# Patient Record
Sex: MALE | URBAN - METROPOLITAN AREA
[De-identification: names, ages, dates, MRNs, and addresses within clinical notes are randomized per-mention and may not be internally consistent; named-entity substitution may affect disease eponyms.]

---

## 2017-06-07 ENCOUNTER — APPOINTMENT (OUTPATIENT)
Age: 77
Setting detail: DERMATOLOGY
End: 2017-06-12

## 2017-06-07 DIAGNOSIS — L20.89 OTHER ATOPIC DERMATITIS: ICD-10-CM

## 2017-06-07 DIAGNOSIS — L29.8 OTHER PRURITUS: ICD-10-CM

## 2017-06-07 PROBLEM — L20.84 INTRINSIC (ALLERGIC) ECZEMA: Status: ACTIVE | Noted: 2017-06-07

## 2017-06-07 PROCEDURE — OTHER PRESCRIPTION: OTHER

## 2017-06-07 PROCEDURE — OTHER COUNSELING: OTHER

## 2017-06-07 PROCEDURE — OTHER OTHER: OTHER

## 2017-06-07 PROCEDURE — OTHER TREATMENT REGIMEN: OTHER

## 2017-06-07 PROCEDURE — 99203 OFFICE O/P NEW LOW 30 MIN: CPT

## 2017-06-07 RX ORDER — BETAMETHASONE DIPROPIONATE 0.5 MG/G
CREAM TOPICAL
Qty: 2 | Refills: 0 | Status: ERX | COMMUNITY
Start: 2017-06-07

## 2017-06-07 ASSESSMENT — LOCATION SIMPLE DESCRIPTION DERM
LOCATION SIMPLE: LEFT FOREARM
LOCATION SIMPLE: RIGHT THIGH
LOCATION SIMPLE: LEFT PRETIBIAL REGION
LOCATION SIMPLE: RIGHT FOREARM
LOCATION SIMPLE: RIGHT PRETIBIAL REGION
LOCATION SIMPLE: RIGHT UPPER BACK
LOCATION SIMPLE: ABDOMEN
LOCATION SIMPLE: LEFT THIGH

## 2017-06-07 ASSESSMENT — LOCATION ZONE DERM
LOCATION ZONE: LEG
LOCATION ZONE: ARM
LOCATION ZONE: TRUNK

## 2017-06-07 ASSESSMENT — LOCATION DETAILED DESCRIPTION DERM
LOCATION DETAILED: RIGHT DISTAL PRETIBIAL REGION
LOCATION DETAILED: LEFT DISTAL DORSAL FOREARM
LOCATION DETAILED: LEFT PROXIMAL PRETIBIAL REGION
LOCATION DETAILED: RIGHT INFERIOR MEDIAL UPPER BACK
LOCATION DETAILED: LEFT ANTERIOR DISTAL THIGH
LOCATION DETAILED: RIGHT PROXIMAL DORSAL FOREARM
LOCATION DETAILED: LEFT LATERAL ABDOMEN
LOCATION DETAILED: RIGHT ANTERIOR DISTAL THIGH
LOCATION DETAILED: EPIGASTRIC SKIN

## 2017-06-07 NOTE — PROCEDURE: TREATMENT REGIMEN
Otc Regimen: .\\nVanicream-apply to damp skin twice a day
Detail Level: Zone
Discontinue Regimen: .\\nHot showers.\\n\\nD.C. Washing with soap trunk/legs/arms
Initiate Treatment: .\\n\\nApply betamethasone cream bid for two weeks

## 2017-06-07 NOTE — PROCEDURE: OTHER
Detail Level: Zone
Other (Free Text): F/U in 2 weeks.  If no improvement, consider repeat biopsy.
Note Text (......Xxx Chief Complaint.): This diagnosis correlates with the

## 2017-06-07 NOTE — HPI: RASH
How Severe Is Your Rash?: severe
Is This A New Presentation, Or A Follow-Up?: Rash
Additional History: \\n\\nSaw another dermatologist in feb/march who prescribed triamcinolone (improved a little)

## 2020-10-20 ENCOUNTER — OFFICE VISIT (OUTPATIENT)
Dept: URBAN - METROPOLITAN AREA CLINIC 71 | Facility: CLINIC | Age: 80
End: 2020-10-20
Payer: MEDICARE

## 2020-10-20 DIAGNOSIS — H52.4 PRESBYOPIA: ICD-10-CM

## 2020-10-20 DIAGNOSIS — H43.811 VITREOUS DEGENERATION, RIGHT EYE: ICD-10-CM

## 2020-10-20 DIAGNOSIS — Z96.1 PRESENCE OF INTRAOCULAR LENS: ICD-10-CM

## 2020-10-20 DIAGNOSIS — H35.373 PUCKERING OF MACULA, BILATERAL: Primary | ICD-10-CM

## 2020-10-20 PROCEDURE — 92004 COMPRE OPH EXAM NEW PT 1/>: CPT | Performed by: OPHTHALMOLOGY

## 2020-10-20 PROCEDURE — 92134 CPTRZ OPH DX IMG PST SGM RTA: CPT | Performed by: OPHTHALMOLOGY

## 2020-10-20 PROCEDURE — 92133 CPTRZD OPH DX IMG PST SGM ON: CPT | Performed by: OPHTHALMOLOGY

## 2020-10-20 ASSESSMENT — KERATOMETRY
OD: 43.63
OS: 44.75

## 2020-10-20 ASSESSMENT — INTRAOCULAR PRESSURE
OD: 14
OS: 13

## 2020-10-20 ASSESSMENT — VISUAL ACUITY
OS: 20/20
OD: 20/30

## 2020-10-20 NOTE — IMPRESSION/PLAN
Impression: Puckering of macula, bilateral: H35.373. OCT ordered. OS is S/P vitrectomy. Plan: Epiretinal membranes do not usually require treatment, unless distorted vision or blurry vision occur. The main treatment consists of vitrectomy surgery with peeling off the ERM. Contact office if you experience a loss of vision or distortion. No treatment recommended at this time. Monitor for changes or progression. Patient to return in 6 months.

## 2021-04-20 ENCOUNTER — OFFICE VISIT (OUTPATIENT)
Dept: URBAN - METROPOLITAN AREA CLINIC 71 | Facility: CLINIC | Age: 81
End: 2021-04-20
Payer: MEDICARE

## 2021-04-20 PROCEDURE — 99213 OFFICE O/P EST LOW 20 MIN: CPT | Performed by: OPHTHALMOLOGY

## 2021-04-20 PROCEDURE — 92134 CPTRZ OPH DX IMG PST SGM RTA: CPT | Performed by: OPHTHALMOLOGY

## 2021-04-20 ASSESSMENT — INTRAOCULAR PRESSURE
OD: 14
OS: 13

## 2021-04-20 NOTE — IMPRESSION/PLAN
Impression: Vitreous degeneration, right eye: H43.811. No holes or tears seen today. Stable. Plan: Contact office if symptoms worsen, including increased floaters, flashing light, loss of vision or a black curtain blocking your field of vision.

## 2021-04-20 NOTE — IMPRESSION/PLAN
Impression: Puckering of macula, bilateral: H35.373. OCT ordered today and reviewed. No changes seen today. Patient does not feel visually effected by ERM. Plan: No surgical treatment recommended at this time. Patient to call if any changes or distortion in vision occur.

## 2021-12-07 ENCOUNTER — OFFICE VISIT (OUTPATIENT)
Dept: URBAN - METROPOLITAN AREA CLINIC 71 | Facility: CLINIC | Age: 81
End: 2021-12-07
Payer: MEDICARE

## 2021-12-07 PROCEDURE — 92134 CPTRZ OPH DX IMG PST SGM RTA: CPT | Performed by: OPHTHALMOLOGY

## 2021-12-07 PROCEDURE — 99213 OFFICE O/P EST LOW 20 MIN: CPT | Performed by: OPHTHALMOLOGY

## 2021-12-07 ASSESSMENT — KERATOMETRY
OS: 44.75
OD: 43.75

## 2021-12-07 ASSESSMENT — INTRAOCULAR PRESSURE
OD: 16
OS: 15

## 2021-12-07 NOTE — IMPRESSION/PLAN
Impression: Puckering of macula, bilateral: H35.373. OS is S/P peel, with a residual ERM. OCT ordered today and reviewed. OCT and physical exam show no changes. Patient does not feel visually effected by ERM. Plan: No surgical treatment recommended at this time. Patient to call if any changes or distortion in vision occur. Patient to return in 1 year for dilation and repeat OCT testing.

## 2022-12-07 ENCOUNTER — OFFICE VISIT (OUTPATIENT)
Dept: URBAN - METROPOLITAN AREA CLINIC 71 | Facility: CLINIC | Age: 82
End: 2022-12-07
Payer: MEDICARE

## 2022-12-07 DIAGNOSIS — H43.811 VITREOUS DEGENERATION, RIGHT EYE: ICD-10-CM

## 2022-12-07 DIAGNOSIS — Z96.1 PRESENCE OF INTRAOCULAR LENS: ICD-10-CM

## 2022-12-07 DIAGNOSIS — H35.373 PUCKERING OF MACULA, BILATERAL: Primary | ICD-10-CM

## 2022-12-07 PROCEDURE — 92134 CPTRZ OPH DX IMG PST SGM RTA: CPT | Performed by: OPHTHALMOLOGY

## 2022-12-07 PROCEDURE — 99212 OFFICE O/P EST SF 10 MIN: CPT | Performed by: OPHTHALMOLOGY

## 2022-12-07 ASSESSMENT — INTRAOCULAR PRESSURE
OD: 11
OS: 14

## 2022-12-07 ASSESSMENT — KERATOMETRY
OS: 44.88
OD: 43.75

## 2022-12-07 ASSESSMENT — VISUAL ACUITY
OD: 20/20
OS: 20/25

## 2022-12-07 NOTE — IMPRESSION/PLAN
Impression: Puckering of macula, bilateral: H35.373. OS is S/P peel, with a residual ERM. OCT ordered today and reviewed. Testing and physical exam are remaining stable with no traction. Plan: No surgical treatment recommended at this time. Patient to call if any changes or distortion in vision occur. Patient to return in 6 months for dilation and repeat OCT testing.

## 2022-12-07 NOTE — IMPRESSION/PLAN
Impression: Vitreous degeneration, right eye: H43.811. No holes or tears seen today. Stable. Plan: Monitor.

## 2022-12-10 ENCOUNTER — TESTING ONLY (OUTPATIENT)
Dept: URBAN - METROPOLITAN AREA CLINIC 71 | Facility: CLINIC | Age: 82
End: 2022-12-10

## 2022-12-10 DIAGNOSIS — H52.4 PRESBYOPIA: Primary | ICD-10-CM

## 2022-12-10 ASSESSMENT — INTRAOCULAR PRESSURE
OS: 18
OD: 15

## 2022-12-10 ASSESSMENT — VISUAL ACUITY
OD: 20/25
OS: 20/20

## 2022-12-10 NOTE — IMPRESSION/PLAN
Impression: Presbyopia: H52.4. Plan: Issued new spec Rx, discussed minimal changes and possible adaptation period.  
RTC 1yr or prn, otherwise RTC as scheduled in 6mo with Dr. Abdulaziz GOMEZ/OCT

## 2023-06-07 ENCOUNTER — OFFICE VISIT (OUTPATIENT)
Dept: URBAN - METROPOLITAN AREA CLINIC 71 | Facility: CLINIC | Age: 83
End: 2023-06-07
Payer: MEDICARE

## 2023-06-07 DIAGNOSIS — H35.373 PUCKERING OF MACULA, BILATERAL: Primary | ICD-10-CM

## 2023-06-07 DIAGNOSIS — Z96.1 PRESENCE OF INTRAOCULAR LENS: ICD-10-CM

## 2023-06-07 DIAGNOSIS — H43.811 VITREOUS DEGENERATION, RIGHT EYE: ICD-10-CM

## 2023-06-07 PROCEDURE — 99214 OFFICE O/P EST MOD 30 MIN: CPT | Performed by: OPHTHALMOLOGY

## 2023-06-07 PROCEDURE — 92134 CPTRZ OPH DX IMG PST SGM RTA: CPT | Performed by: OPHTHALMOLOGY

## 2023-06-07 ASSESSMENT — INTRAOCULAR PRESSURE
OS: 14
OD: 13

## 2023-06-07 NOTE — IMPRESSION/PLAN
Impression: Puckering of macula, bilateral: H35.373. OS is S/P peel, with a residual ERM. OCT ordered today and reviewed. Testing and exam are remaining stable with no traction. Plan: No surgical treatment recommended at this time. Patient to call if any changes or distortion in vision occur. Testing is remaining stable. Patient to return in 1 year for dilation and repeat OCT testing.

## 2024-06-11 ENCOUNTER — OFFICE VISIT (OUTPATIENT)
Dept: URBAN - METROPOLITAN AREA CLINIC 71 | Facility: CLINIC | Age: 84
End: 2024-06-11
Payer: MEDICARE

## 2024-06-11 DIAGNOSIS — Z96.1 PRESENCE OF INTRAOCULAR LENS: ICD-10-CM

## 2024-06-11 DIAGNOSIS — H35.373 PUCKERING OF MACULA, BILATERAL: Primary | ICD-10-CM

## 2024-06-11 DIAGNOSIS — H43.811 VITREOUS DEGENERATION, RIGHT EYE: ICD-10-CM

## 2024-06-11 DIAGNOSIS — E11.9 TYPE 2 DIABETES MELLITUS W/O COMPLICATION: ICD-10-CM

## 2024-06-11 PROCEDURE — 92133 CPTRZD OPH DX IMG PST SGM ON: CPT | Performed by: OPHTHALMOLOGY

## 2024-06-11 PROCEDURE — 99214 OFFICE O/P EST MOD 30 MIN: CPT | Performed by: OPHTHALMOLOGY

## 2024-06-11 PROCEDURE — 92134 CPTRZ OPH DX IMG PST SGM RTA: CPT | Performed by: OPHTHALMOLOGY

## 2024-06-11 ASSESSMENT — INTRAOCULAR PRESSURE
OS: 15
OD: 13

## 2024-07-25 ENCOUNTER — OFFICE VISIT (OUTPATIENT)
Dept: URBAN - METROPOLITAN AREA CLINIC 71 | Facility: CLINIC | Age: 84
End: 2024-07-25
Payer: MEDICARE

## 2024-07-25 DIAGNOSIS — H35.373 PUCKERING OF MACULA, BILATERAL: Primary | ICD-10-CM

## 2024-07-25 PROCEDURE — 99214 OFFICE O/P EST MOD 30 MIN: CPT | Performed by: OPHTHALMOLOGY

## 2024-07-25 PROCEDURE — 92134 CPTRZ OPH DX IMG PST SGM RTA: CPT | Performed by: OPHTHALMOLOGY

## 2024-07-25 ASSESSMENT — INTRAOCULAR PRESSURE
OD: 13
OS: 16

## 2025-06-11 ENCOUNTER — OFFICE VISIT (OUTPATIENT)
Dept: URBAN - METROPOLITAN AREA CLINIC 71 | Facility: CLINIC | Age: 85
End: 2025-06-11
Payer: MEDICARE

## 2025-06-11 DIAGNOSIS — H35.373 PUCKERING OF MACULA, BILATERAL: Primary | ICD-10-CM

## 2025-06-11 DIAGNOSIS — Z96.1 PRESENCE OF INTRAOCULAR LENS: ICD-10-CM

## 2025-06-11 DIAGNOSIS — E11.9 TYPE 2 DIABETES MELLITUS W/O COMPLICATION: ICD-10-CM

## 2025-06-11 DIAGNOSIS — H26.492 OTHER SECONDARY CATARACT, LEFT EYE: ICD-10-CM

## 2025-06-11 DIAGNOSIS — H43.811 VITREOUS DEGENERATION, RIGHT EYE: ICD-10-CM

## 2025-06-11 PROCEDURE — 92134 CPTRZ OPH DX IMG PST SGM RTA: CPT | Performed by: OPHTHALMOLOGY

## 2025-06-11 PROCEDURE — 92133 CPTRZD OPH DX IMG PST SGM ON: CPT | Performed by: OPHTHALMOLOGY

## 2025-06-11 PROCEDURE — 99213 OFFICE O/P EST LOW 20 MIN: CPT | Performed by: OPHTHALMOLOGY

## 2025-06-11 ASSESSMENT — INTRAOCULAR PRESSURE
OS: 11
OD: 11

## 2025-07-14 ENCOUNTER — OFFICE VISIT (OUTPATIENT)
Dept: URBAN - METROPOLITAN AREA CLINIC 71 | Facility: CLINIC | Age: 85
End: 2025-07-14

## 2025-07-14 DIAGNOSIS — H52.4 PRESBYOPIA: Primary | ICD-10-CM

## 2025-07-14 ASSESSMENT — VISUAL ACUITY
OS: 20/20
OD: 20/25